# Patient Record
Sex: FEMALE | Race: WHITE
[De-identification: names, ages, dates, MRNs, and addresses within clinical notes are randomized per-mention and may not be internally consistent; named-entity substitution may affect disease eponyms.]

---

## 2017-02-13 ENCOUNTER — HOSPITAL ENCOUNTER (OUTPATIENT)
Dept: HOSPITAL 45 - C.RDSM | Age: 47
Discharge: HOME | End: 2017-02-13
Attending: ORTHOPAEDIC SURGERY
Payer: COMMERCIAL

## 2017-02-13 DIAGNOSIS — Z96.649: Primary | ICD-10-CM

## 2017-04-24 ENCOUNTER — HOSPITAL ENCOUNTER (OUTPATIENT)
Dept: HOSPITAL 45 - C.RDSM | Age: 47
Discharge: HOME | End: 2017-04-24
Attending: ORTHOPAEDIC SURGERY
Payer: COMMERCIAL

## 2017-04-24 DIAGNOSIS — M25.561: Primary | ICD-10-CM

## 2017-04-24 DIAGNOSIS — M25.562: ICD-10-CM

## 2017-05-18 ENCOUNTER — HOSPITAL ENCOUNTER (EMERGENCY)
Dept: HOSPITAL 45 - C.EDB | Age: 47
LOS: 1 days | Discharge: HOME | End: 2017-05-19
Payer: COMMERCIAL

## 2017-05-18 VITALS
BODY MASS INDEX: 39.38 KG/M2 | HEIGHT: 67.01 IN | WEIGHT: 250.89 LBS | HEIGHT: 67.01 IN | BODY MASS INDEX: 39.38 KG/M2 | WEIGHT: 250.89 LBS

## 2017-05-18 DIAGNOSIS — K21.9: ICD-10-CM

## 2017-05-18 DIAGNOSIS — Z82.49: ICD-10-CM

## 2017-05-18 DIAGNOSIS — E78.5: ICD-10-CM

## 2017-05-18 DIAGNOSIS — Z83.3: ICD-10-CM

## 2017-05-18 DIAGNOSIS — F41.9: ICD-10-CM

## 2017-05-18 DIAGNOSIS — Z87.891: ICD-10-CM

## 2017-05-18 DIAGNOSIS — I10: ICD-10-CM

## 2017-05-18 DIAGNOSIS — M16.10: ICD-10-CM

## 2017-05-18 DIAGNOSIS — M79.7: ICD-10-CM

## 2017-05-18 DIAGNOSIS — E11.9: ICD-10-CM

## 2017-05-18 DIAGNOSIS — Z79.4: ICD-10-CM

## 2017-05-18 DIAGNOSIS — Z51.81: ICD-10-CM

## 2017-05-18 DIAGNOSIS — Z79.01: ICD-10-CM

## 2017-05-18 DIAGNOSIS — R51: Primary | ICD-10-CM

## 2017-05-18 LAB
ALBUMIN/GLOB SERPL: 1.1 {RATIO} (ref 0.9–2)
ALP SERPL-CCNC: 93 U/L (ref 45–117)
ALT SERPL-CCNC: 88 U/L (ref 12–78)
ANION GAP SERPL CALC-SCNC: 8 MMOL/L (ref 3–11)
APPEARANCE UR: CLEAR
AST SERPL-CCNC: 51 U/L (ref 15–37)
BILIRUB UR-MCNC: (no result) MG/DL
BUN SERPL-MCNC: 10 MG/DL (ref 7–18)
BUN/CREAT SERPL: 13.9 (ref 10–20)
CALCIUM SERPL-MCNC: 8.7 MG/DL (ref 8.5–10.1)
CHLORIDE SERPL-SCNC: 107 MMOL/L (ref 98–107)
CKMB/CK RATIO: 0.8 (ref 0–3)
CO2 SERPL-SCNC: 28 MMOL/L (ref 21–32)
COLOR UR: YELLOW
CREAT CL PREDICTED SERPL C-G-VRATE: 133.6 ML/MIN
CREAT SERPL-MCNC: 0.7 MG/DL (ref 0.6–1.2)
EOSINOPHIL NFR BLD AUTO: 233 K/UL (ref 130–400)
GLOBULIN SER-MCNC: 3.5 GM/DL (ref 2.5–4)
GLUCOSE SERPL-MCNC: 142 MG/DL (ref 70–99)
HCT VFR BLD CALC: 44.6 % (ref 37–47)
MAGNESIUM SERPL-MCNC: 2.1 MG/DL (ref 1.8–2.4)
MCH RBC QN AUTO: 28.5 PG (ref 25–34)
MCHC RBC AUTO-ENTMCNC: 33.6 G/DL (ref 32–36)
MCV RBC AUTO: 84.6 FL (ref 80–100)
NITRITE UR QL STRIP: (no result)
PH UR STRIP: 6.5 [PH] (ref 4.5–7.5)
PMV BLD AUTO: 10.3 FL (ref 7.4–10.4)
POTASSIUM SERPL-SCNC: 3.9 MMOL/L (ref 3.5–5.1)
RBC # BLD AUTO: 5.27 M/UL (ref 4.2–5.4)
SODIUM SERPL-SCNC: 143 MMOL/L (ref 136–145)
SP GR UR STRIP: 1.01 (ref 1–1.03)
TSH SERPL-ACNC: 2.29 UIU/ML (ref 0.3–4.5)
URINE BILL WITH OR WITHOUT MIC: (no result)
UROBILINOGEN UR-MCNC: (no result) MG/DL
WBC # BLD AUTO: 5.22 K/UL (ref 4.8–10.8)
ZZUR CULT IF INDIC CLEAN CATCH: NO

## 2017-05-19 VITALS — HEART RATE: 87 BPM | DIASTOLIC BLOOD PRESSURE: 69 MMHG | OXYGEN SATURATION: 94 % | SYSTOLIC BLOOD PRESSURE: 115 MMHG

## 2017-05-19 VITALS — OXYGEN SATURATION: 96 %

## 2017-05-19 LAB
BASOPHILS # BLD: 0.03 K/UL (ref 0–0.2)
BASOPHILS NFR BLD: 0.6 %
COMPLETE: YES
IG%: 0 %
IMM GRANULOCYTES NFR BLD AUTO: 50.6 %
LYME DISEASE AB IGG: (no result)
LYME DISEASE AB IGM: (no result)
LYMPHOCYTES # BLD: 2.64 K/UL (ref 1.2–3.4)
MANUAL MICROSCOPIC REQUIRED?: NO
MONOCYTES NFR BLD: 7.1 %
NEUTROPHILS # BLD AUTO: 5.2 %
NEUTROPHILS NFR BLD AUTO: 36.5 %
REVIEW REQ?: NO

## 2017-05-19 NOTE — EMERGENCY ROOM VISIT NOTE
ED Visit Note


First contact with patient:  22:57


Agree with the physician assistants workup.  I've seen the patient patient's 

 at bedside.  They're ready for discharge we've answered all questions.


Problem List


Medical Problems:


(1) Anxiety


Status: Chronic  





(2) CALCULUS OF KIDNEY


Status: Resolved  





(3) Depression


Status: Chronic  





(4) Fibromyalgia


Status: Chronic  





(5) GERD (gastroesophageal reflux disease)


Status: Chronic  





(6) HLD (hyperlipidemia)


Status: Chronic  





(7) HTN (hypertension)


Status: Chronic  





(8) IDDM (insulin dependent diabetes mellitus)


Status: Chronic  





(9) maxillary sinus surgery


Status: Chronic  





(10) Meningitis


Status: Resolved  





(11) Migraine


Status: Chronic  





(12) MYALGIA AND MYOSITIS NOS


Status: Chronic  





(13) Sinus surgery


Status: Resolved  





Surgical Problems:


(1) H/O laparoscopy


Status: Chronic  





(2) History of cholecystectomy


Status: Resolved  





(3) History of tonsillectomy


Status: Chronic  





(4) S/P hip replacement


Status: Chronic  











Current/Historical Medications


Scheduled


Cholecalciferol (Vitamin D3), 1,000 INTER.UNIT PO QPM


Escitalopram Oxalate (Escitalopram Oxalate), 0.8 ML PO BID


Ferrous Sulfate (Iron Supplement), 325 MG PO QPM


Fluticasone Propionate (Fluticasone Propionate), 2 SPRAYS SMOOTH QPM


Insulin Aspart (Novolog Flexpen), 14 UNITS SQ QAM/NOON


Insulin Aspart (Novolog Flexpen), 16 UNITS SC QPM


Insulin Glargine (Lantus Solostar), 50 UNITS SC QPM


Lisinopril (Lisinopril), 10 MG PO DAILY


Metformin Hcl (Glucophage Ext Rel), 1,000 MG PO BID


Omeprazole (Prilosec), 20 MG PO BID


Oxycodone Hcl (Oxycontin), 10 MG PO BID


Vitamin E (Vitamin E), 400 INTER.UNIT PO QPM


Warfarin Sodium (Warfarin Sodium), 8 MG PO DAILY





Scheduled PRN


Acetamin/Butalbital/Caffeine (Fioricet), 1-2 TABS PO Q4H PRN for HEADACHE


Clonazepam (Klonopin), 1 MG PO BID PRN for Anxiety


Diphenoxylate/Atropine (Lomotil), 0.5 TAB PO Q6H PRN for Diarrhea


Hyoscyamine Sulfate (Levsin), 0.125-0.25 MG PO Q6H PRN for Abdominal Cramping


Ondansetron (Ondansetron HCl), 4 MG PO Q8 PRN for Nausea


Oxycodone Hcl (Oxycontin), 30 MG PO AS DIRECTED PRN for Pain


Tizanidine (Zanaflex ), 4 MG PO TID PRN for FIBROMYALGIA





Allergies


Coded Allergies:  


     Metoclopramide (Verified  Allergy, Intermediate, RASH,ITCH, 5/18/17)


     Sulfa Antibiotics (Verified  Allergy, Intermediate, Rash / Itching, 5/18/17

)


     Dexamethasone (Verified  Allergy, Mild, ITCH, 5/18/17)


 PT REPORTS DIFFUSE BODY ITCH/REDNESS. NOT HIVES PER DR. NEVES.


     Adhesives (Unverified  Allergy, Unknown, REDNESS WITH SOME TAPE-INNER 

THIGH AREA SENSITIVE AREA, 5/18/17)


     Levofloxacin (Verified  Allergy, Unknown, PARANOIA, 5/18/17)


     Morphine (Verified  Allergy, Unknown, NAUSEA AND VOMITING, 5/18/17)


 PT THINKS RECEIVED TOO FAST


     Niacin (Verified  Allergy, Unknown, HIVES, 5/18/17)


     Prochlorperazine (Verified  Adverse Reaction, Intermediate, Dystonic, 5/18/ 17)


     Promethazine (Verified  Adverse Reaction, Intermediate, Dystonic, 5/18/17)





Vital Signs











  Date Time  Temp Pulse Resp B/P Pulse Ox O2 Delivery O2 Flow Rate FiO2


 


5/19/17 00:33     96 Nasal Cannula 2.0 


 


5/19/17 00:32  93 16 123/76 98 Nasal Cannula 2.0 


 


5/18/17 23:48  88 20 110/78 98 Room Air  


 


5/18/17 23:48  87      


 


5/18/17 23:10     98 Room Air  


 


5/18/17 22:56  89 20 140/80 98 Room Air  











Laboratory Results


5/18/17 23:05








Red Blood Count 5.27, Mean Corpuscular Volume 84.6, Mean Corpuscular Hemoglobin 

28.5, Mean Corpuscular Hemoglobin Concent 33.6, Mean Platelet Volume 10.3, 

Neutrophils (%) (Auto) 36.5, Lymphocytes (%) (Auto) 50.6, Monocytes (%) (Auto) 

7.1, Eosinophils (%) (Auto) 5.2, Basophils (%) (Auto) 0.6, Neutrophils # (Auto) 

1.91, Lymphocytes # (Auto) 2.64, Monocytes # (Auto) 0.37, Eosinophils # (Auto) 

0.27, Basophils # (Auto) 0.03





5/18/17 23:05

















Test


  5/18/17


22:53 5/18/17


23:05


 


Urine Color YELLOW  


 


Urine Appearance CLEAR (CLEAR)  


 


Urine pH 6.5 (4.5-7.5)  


 


Urine Specific Gravity


  1.006


(1.000-1.030) 


 


 


Urine Protein NEG (NEG)  


 


Urine Glucose (UA) NEG (NEG)  


 


Urine Ketones NEG (NEG)  


 


Urine Occult Blood NEG (NEG)  


 


Urine Nitrite NEG (NEG)  


 


Urine Bilirubin NEG (NEG)  


 


Urine Urobilinogen NEG (NEG)  


 


Urine Leukocyte Esterase NEG (NEG)  


 


Urine Pregnancy Test NEG (NEG)  


 


White Blood Count


  


  5.22 K/uL


(4.8-10.8)


 


Red Blood Count


  


  5.27 M/uL


(4.2-5.4)


 


Hemoglobin


  


  15.0 g/dL


(12.0-16.0)


 


Hematocrit  44.6 % (37-47) 


 


Mean Corpuscular Volume


  


  84.6 fL


()


 


Mean Corpuscular Hemoglobin


  


  28.5 pg


(25-34)


 


Mean Corpuscular Hemoglobin


Concent 


  33.6 g/dl


(32-36)


 


Platelet Count


  


  233 K/uL


(130-400)


 


Mean Platelet Volume


  


  10.3 fL


(7.4-10.4)


 


Neutrophils (%) (Auto)  36.5 % 


 


Lymphocytes (%) (Auto)  50.6 % 


 


Monocytes (%) (Auto)  7.1 % 


 


Eosinophils (%) (Auto)  5.2 % 


 


Basophils (%) (Auto)  0.6 % 


 


Neutrophils # (Auto)


  


  1.91 K/uL


(1.4-6.5)


 


Lymphocytes # (Auto)


  


  2.64 K/uL


(1.2-3.4)


 


Monocytes # (Auto)


  


  0.37 K/uL


(0.11-0.59)


 


Eosinophils # (Auto)


  


  0.27 K/uL


(0-0.5)


 


Basophils # (Auto)


  


  0.03 K/uL


(0-0.2)


 


RDW Standard Deviation


  


  43.8 fL


(36.4-46.3)


 


RDW Coefficient of Variation


  


  14.2 %


(11.5-14.5)


 


Immature Granulocyte % (Auto)  0.0 % 


 


Immature Granulocyte # (Auto)


  


  0.00 K/uL


(0.00-0.02)


 


Anion Gap


  


  8.0 mmol/L


(3-11)


 


Est Creatinine Clear Calc


Drug Dose 


  133.6 ml/min 


 


 


Estimated GFR (


American) 


  122.1 


 


 


Estimated GFR (Non-


American 


  105.4 


 


 


BUN/Creatinine Ratio  13.9 (10-20) 


 


Calcium Level


  


  8.7 mg/dl


(8.5-10.1)


 


Magnesium Level


  


  2.1 mg/dl


(1.8-2.4)


 


Total Bilirubin


  


  0.3 mg/dl


(0.2-1)


 


Aspartate Amino Transf


(AST/SGOT) 


  51 U/L (15-37) 


 


 


Alanine Aminotransferase


(ALT/SGPT) 


  88 U/L (12-78) 


 


 


Alkaline Phosphatase


  


  93 U/L


()


 


Total Creatine Kinase


  


  157 U/L


()


 


Creatine Kinase MB


  


  1.2 ng/ml


(0.5-3.6)


 


Creatine Kinase MB Ratio  0.8 (0-3.0) 


 


Troponin I


  


  < 0.015 ng/ml


(0-0.045)


 


Total Protein


  


  7.3 gm/dl


(6.4-8.2)


 


Albumin


  


  3.8 gm/dl


(3.4-5.0)


 


Globulin


  


  3.5 gm/dl


(2.5-4.0)


 


Albumin/Globulin Ratio  1.1 (0.9-2) 


 


Thyroid Stimulating Hormone


(TSH) 


  2.290 uIu/ml


(0.300-4.500)


 


Lyme Disease IgG Antibody  NEG (NEG) 


 


Lyme Disease IgM Antibody  NEG (NEG) 











Medications Administered











 Medications


  (Trade)  Dose


 Ordered  Sig/Carmelita


 Route  Start Time


 Stop Time Status Last Admin


Dose Admin


 


 Lorazepam


  (Ativan Inj)  1 mg  NOW  STAT


 IV  5/18/17 23:23


 5/18/17 23:24 DC 5/18/17 23:28


1 MG


 


 Albuterol/


 Ipratropium


  (Duoneb)  3 ml  NOW  STAT


 INH  5/18/17 23:48


 5/18/17 23:50 DC 5/19/17 00:04


3 ML


 


 Diphenhydramine


 HCl


  (Benadryl Inj)  25 mg  NOW  STAT


 IV  5/18/17 23:58


 5/18/17 23:59 DC 5/19/17 00:06


25 MG


 


 Hydromorphone HCl


  (Dilaudid Inj)  1 mg  NOW  STAT


 IV  5/18/17 23:58


 5/18/17 23:59 DC 5/19/17 00:06


1 MG


 


 Ondansetron HCl


  (Zofran Inj)  4 mg  NOW  STAT


 IV  5/18/17 23:58


 5/18/17 23:59 DC 5/19/17 00:04


4 MG











Departure Information


Dispostion


Home / Self-Care





Condition


GOOD





Referrals


Cristy Braden M.D. (PCP)





Forms


HOME CARE DOCUMENTATION FORM,                                                 

               IMPORTANT VISIT INFORMATION





Patient Instructions


My [facility]





Additional Instructions





You have been treated in the Emergency Department for a Headache.  You have 

received pain medicine in the emergency department which impairs your ability 

to operate a vehicle. It is illegal for you to drive after receiving these 

medicines. 





For pain control, you can use the following over-the-counter medicines (if >11 yo):





- Regular strength (325mg/tab) Tylenol (acetaminophen) 2 tabs every 4-6 hours 

as needed. Do not exceed 12 tablets in a 24 hour period. Avoid taking more than 

4 grams (4000 mg) of Tylenol per day. This includes any other sources of 

acetaminophen you may take on a regular basis.





- Regular strength (200 mg/tab) Advil (ibuprofen) 1-2 tabs every 4-6 hours as 

needed. Do not exceed a dose of 3200 mg per day.





You should relax in a quiet, dark place for the rest of the day. Avoid any 

possible triggers including: cigarette smoke, caffeine, nicotine, chocolate, 

wine, beer, loud noises or music, or bright lights. 





You should schedule a follow-up appointment in 2-3 days with your Primary Care 

Provider or established Neurologist for further evaluation and treatment of 

your Headache.





Return to the Emergency Department if your current symptoms worsen despite 

treatment course outlined above, or if you develop any of the following symptoms

: intractable pain despite aforementioned treatment course, visual disturbances

, loss of vision, unilateral weakness or facial drooping, slurring of speech, 

loss of coordination, or loss of consciousness.

## 2017-05-19 NOTE — DIAGNOSTIC IMAGING REPORT
CHEST ONE VIEW PORTABLE



CLINICAL HISTORY: Left-sided chest pain    



COMPARISON STUDY:  12/20/2016



FINDINGS: The cardiac and mediastinal contours are normal. There is no evidence

of focal pulmonary consolidation. There is no evidence of failure. No pleural

effusions are visualized.[ 



IMPRESSION: No active disease in the chest.







Electronically signed by:  Daniele Gerardo M.D.

5/19/2017 6:39 AM



Dictated Date/Time:  5/19/2017 6:39 AM

## 2017-05-19 NOTE — EMERGENCY ROOM VISIT NOTE
History


First contact with patient:  22:57


Chief Complaint:  CHEST PAIN


Stated Complaint:  CHEST PAINS,DIZZY,AB PAIN,EXTREME FATIGUE


Nursing Triage Summary:  


c/o chest pain to center of chest along with left shoulder discomfort since 

this 


morning. patient also states she is having some abdominal pain and what she 


describes as reflux type symptoms.





History of Present Illness


The patient is a 46 year old female who presents to the Emergency Room with 

multiple complaints.  The patient states that she has chest heaviness, left 

shoulder pain, abdominal pain, headache, body aches, left calf pain and nausea.

  She states that all these symptoms occurred suddenly after receiving a 

Euveflexx injection into her left knee at the orthopedic office earlier today.  

She does have a history of chronic headaches due to a previous sinus procedure 

but states that this is worse than her typical headaches.  It is not the worst 

headache of her life.  She does report a history of anxiety and feels that her 

symptoms may be related to her anxiety.  She feels like she is not able to 

fully inflate her left lung.  She denies any cardiac history.  She took 0.5 mg 

Klonopin earlier for her symptoms but this did not help.  She does state that 

she called the orthopedic provider on-call and they do not feel this is a 

reaction to the medication she received.





Review of Systems


A complete 10 point review of systems was reviewed with the patient with 

pertinent positives and negatives as per history of present illness. All else 

were negative.





Past Medical/Surgical History


Medical Problems:


(1) Anxiety


(2) CALCULUS OF KIDNEY


(3) Depression


(4) DJD (degenerative joint disease) of hip


(5) Fibromyalgia


(6) GERD (gastroesophageal reflux disease)


(7) HLD (hyperlipidemia)


(8) HTN (hypertension)


(9) IDDM (insulin dependent diabetes mellitus)


(10) maxillary sinus surgery


(11) Meningitis


(12) Migraine


(13) MYALGIA AND MYOSITIS NOS


(14) Sinus surgery


Surgical Problems:


(1) H/O laparoscopy


(2) History of cholecystectomy


(3) History of tonsillectomy


(4) S/P hip replacement








Family History





Cancer


Diabetes mellitus


Fhx: high triglycerides


Heart disease


Hypertension


Kidney disease


Kidney stones





Social History


Smoking Status:  Former Smoker


Alcohol Use:  none


Drug Use:  none


Marital Status:  


Housing Status:  lives with family


Occupation Status:  unemployed





Current/Historical Medications


Scheduled


Cholecalciferol (Vitamin D3), 1,000 INTER.UNIT PO QPM


Escitalopram Oxalate (Escitalopram Oxalate), 0.8 ML PO BID


Ferrous Sulfate (Iron Supplement), 325 MG PO QPM


Fluticasone Propionate (Fluticasone Propionate), 2 SPRAYS SMOOTH QPM


Insulin Aspart (Novolog Flexpen), 14 UNITS SQ QAM/NOON


Insulin Aspart (Novolog Flexpen), 16 UNITS SC QPM


Insulin Glargine (Lantus Solostar), 50 UNITS SC QPM


Lisinopril (Lisinopril), 10 MG PO DAILY


Metformin Hcl (Glucophage Ext Rel), 1,000 MG PO BID


Omeprazole (Prilosec), 20 MG PO BID


Oxycodone Hcl (Oxycontin), 10 MG PO BID


Vitamin E (Vitamin E), 400 INTER.UNIT PO QPM


Warfarin Sodium (Warfarin Sodium), 8 MG PO DAILY





Scheduled PRN


Acetamin/Butalbital/Caffeine (Fioricet), 1-2 TABS PO Q4H PRN for HEADACHE


Clonazepam (Klonopin), 1 MG PO BID PRN for Anxiety


Diphenoxylate/Atropine (Lomotil), 0.5 TAB PO Q6H PRN for Diarrhea


Hyoscyamine Sulfate (Levsin), 0.125-0.25 MG PO Q6H PRN for Abdominal Cramping


Ondansetron (Ondansetron HCl), 4 MG PO Q8 PRN for Nausea


Oxycodone Hcl (Oxycontin), 30 MG PO AS DIRECTED PRN for Pain


Tizanidine (Zanaflex ), 4 MG PO TID PRN for FIBROMYALGIA





Allergies


Coded Allergies:  


     Metoclopramide (Verified  Allergy, Intermediate, RASH,ITCH, 5/18/17)


     Sulfa Antibiotics (Verified  Allergy, Intermediate, Rash / Itching, 5/18/17

)


     Dexamethasone (Verified  Allergy, Mild, ITCH, 5/18/17)


 PT REPORTS DIFFUSE BODY ITCH/REDNESS. NOT HIVES PER DR. NEVES.


     Adhesives (Unverified  Allergy, Unknown, REDNESS WITH SOME TAPE-INNER 

THIGH AREA SENSITIVE AREA, 5/18/17)


     Levofloxacin (Verified  Allergy, Unknown, PARANOIA, 5/18/17)


     Morphine (Verified  Allergy, Unknown, NAUSEA AND VOMITING, 5/18/17)


 PT THINKS RECEIVED TOO FAST


     Niacin (Verified  Allergy, Unknown, HIVES, 5/18/17)


     Prochlorperazine (Verified  Adverse Reaction, Intermediate, Dystonic, 5/18/ 17)


     Promethazine (Verified  Adverse Reaction, Intermediate, Dystonic, 5/18/17)





Physical Exam


Vital Signs











  Date Time  Temp Pulse Resp B/P Pulse Ox O2 Delivery O2 Flow Rate FiO2


 


5/19/17 01:24  87 20 115/69 94   


 


5/19/17 00:33     96 Nasal Cannula 2.0 


 


5/19/17 00:32  93 16 123/76 98 Nasal Cannula 2.0 


 


5/18/17 23:48  88 20 110/78 98 Room Air  


 


5/18/17 23:48  87      


 


5/18/17 23:10     98 Room Air  


 


5/18/17 22:56  89 20 140/80 98 Room Air  











Physical Exam


VITALS: Vitals are noted on the nurse's note and reviewed by myself.  Vital 

signs stable.


GENERAL: This is a 46-year-old female, in no acute distress, nondiaphoretic, 

well-developed well-nourished.


SKIN: The skin was without rashes, erythema, edema, or bruising.  


HEAD: Normocephalic atraumatic.  


EARS: External auditory canals clear, tympanic membranes pearly gray without 

erythema or effusion bilaterally.


EYES: Pupils equal round and reactive to light and accommodation.  Conjunctivae 

without injection, sclerae without icterus.  Extraocular movements intact.  No 

nystagmus.


MOUTH: Mucous membranes moist.  Tonsils are not enlarged.  Pharynx without 

erythema or exudate.  


NECK: Supple without nuchal rigidity.  No lymphadenopathy.  No meningismus.


HEART: Regular rate and rhythm without murmurs gallops or rubs.


LUNGS: Clear to auscultation bilaterally without wheezes, rales or rhonchi.   

No retractions or accessory muscle use.


ABDOMEN: Positive bowel sounds x 4.  Soft, no tenderness to palpation.


MUSCULOSKELETAL: Full range of motion in all extremities.   Strength 5/5 

throughout.


NEURO: Patient was alert and oriented to person place and time.  Normal 

sensation to light and sharp touch.  No focal neurological deficits.





Medical Decision & Procedures


ER Provider


Diagnostic Interpretation:


CT HEAD:





No ICH, mass effect, or edema.  No evidence of acute cortical stroke.


Visualized sinuses and mastoid air cells are clear.





Radiologist:  Taiwo Roy MD





Chest x-ray interpretation:


No acute cardiopulmonary abnormalities.  No pneumothorax.  No osseous 

abnormalities.





Laboratory Results


5/18/17 23:05








Red Blood Count 5.27, Mean Corpuscular Volume 84.6, Mean Corpuscular Hemoglobin 

28.5, Mean Corpuscular Hemoglobin Concent 33.6, Mean Platelet Volume 10.3, 

Neutrophils (%) (Auto) 36.5, Lymphocytes (%) (Auto) 50.6, Monocytes (%) (Auto) 

7.1, Eosinophils (%) (Auto) 5.2, Basophils (%) (Auto) 0.6, Neutrophils # (Auto) 

1.91, Lymphocytes # (Auto) 2.64, Monocytes # (Auto) 0.37, Eosinophils # (Auto) 

0.27, Basophils # (Auto) 0.03





5/18/17 23:05

















Test


  5/18/17


22:53 5/18/17


23:05


 


Urine Color YELLOW  


 


Urine Appearance CLEAR (CLEAR)  


 


Urine pH 6.5 (4.5-7.5)  


 


Urine Specific Gravity


  1.006


(1.000-1.030) 


 


 


Urine Protein NEG (NEG)  


 


Urine Glucose (UA) NEG (NEG)  


 


Urine Ketones NEG (NEG)  


 


Urine Occult Blood NEG (NEG)  


 


Urine Nitrite NEG (NEG)  


 


Urine Bilirubin NEG (NEG)  


 


Urine Urobilinogen NEG (NEG)  


 


Urine Leukocyte Esterase NEG (NEG)  


 


Urine Pregnancy Test NEG (NEG)  


 


White Blood Count


  


  5.22 K/uL


(4.8-10.8)


 


Red Blood Count


  


  5.27 M/uL


(4.2-5.4)


 


Hemoglobin


  


  15.0 g/dL


(12.0-16.0)


 


Hematocrit  44.6 % (37-47) 


 


Mean Corpuscular Volume


  


  84.6 fL


()


 


Mean Corpuscular Hemoglobin


  


  28.5 pg


(25-34)


 


Mean Corpuscular Hemoglobin


Concent 


  33.6 g/dl


(32-36)


 


Platelet Count


  


  233 K/uL


(130-400)


 


Mean Platelet Volume


  


  10.3 fL


(7.4-10.4)


 


Neutrophils (%) (Auto)  36.5 % 


 


Lymphocytes (%) (Auto)  50.6 % 


 


Monocytes (%) (Auto)  7.1 % 


 


Eosinophils (%) (Auto)  5.2 % 


 


Basophils (%) (Auto)  0.6 % 


 


Neutrophils # (Auto)


  


  1.91 K/uL


(1.4-6.5)


 


Lymphocytes # (Auto)


  


  2.64 K/uL


(1.2-3.4)


 


Monocytes # (Auto)


  


  0.37 K/uL


(0.11-0.59)


 


Eosinophils # (Auto)


  


  0.27 K/uL


(0-0.5)


 


Basophils # (Auto)


  


  0.03 K/uL


(0-0.2)


 


RDW Standard Deviation


  


  43.8 fL


(36.4-46.3)


 


RDW Coefficient of Variation


  


  14.2 %


(11.5-14.5)


 


Immature Granulocyte % (Auto)  0.0 % 


 


Immature Granulocyte # (Auto)


  


  0.00 K/uL


(0.00-0.02)


 


Anion Gap


  


  8.0 mmol/L


(3-11)


 


Est Creatinine Clear Calc


Drug Dose 


  133.6 ml/min 


 


 


Estimated GFR (


American) 


  122.1 


 


 


Estimated GFR (Non-


American 


  105.4 


 


 


BUN/Creatinine Ratio  13.9 (10-20) 


 


Calcium Level


  


  8.7 mg/dl


(8.5-10.1)


 


Magnesium Level


  


  2.1 mg/dl


(1.8-2.4)


 


Total Bilirubin


  


  0.3 mg/dl


(0.2-1)


 


Aspartate Amino Transf


(AST/SGOT) 


  51 U/L (15-37) 


 


 


Alanine Aminotransferase


(ALT/SGPT) 


  88 U/L (12-78) 


 


 


Alkaline Phosphatase


  


  93 U/L


()


 


Total Creatine Kinase


  


  157 U/L


()


 


Creatine Kinase MB


  


  1.2 ng/ml


(0.5-3.6)


 


Creatine Kinase MB Ratio  0.8 (0-3.0) 


 


Troponin I


  


  < 0.015 ng/ml


(0-0.045)


 


Total Protein


  


  7.3 gm/dl


(6.4-8.2)


 


Albumin


  


  3.8 gm/dl


(3.4-5.0)


 


Globulin


  


  3.5 gm/dl


(2.5-4.0)


 


Albumin/Globulin Ratio  1.1 (0.9-2) 


 


Thyroid Stimulating Hormone


(TSH) 


  2.290 uIu/ml


(0.300-4.500)


 


Lyme Disease IgG Antibody  NEG (NEG) 


 


Lyme Disease IgM Antibody  NEG (NEG) 











Medications Administered











 Medications


  (Trade)  Dose


 Ordered  Sig/Carmelita


 Route  Start Time


 Stop Time Status Last Admin


Dose Admin


 


 Lorazepam


  (Ativan Inj)  1 mg  NOW  STAT


 IV  5/18/17 23:23


 5/18/17 23:24 DC 5/18/17 23:28


1 MG


 


 Albuterol/


 Ipratropium


  (Duoneb)  3 ml  NOW  STAT


 INH  5/18/17 23:48


 5/18/17 23:50 DC 5/19/17 00:04


3 ML


 


 Diphenhydramine


 HCl


  (Benadryl Inj)  25 mg  NOW  STAT


 IV  5/18/17 23:58


 5/18/17 23:59 DC 5/19/17 00:06


25 MG


 


 Hydromorphone HCl


  (Dilaudid Inj)  1 mg  NOW  STAT


 IV  5/18/17 23:58


 5/18/17 23:59 DC 5/19/17 00:06


1 MG


 


 Ondansetron HCl


  (Zofran Inj)  4 mg  NOW  STAT


 IV  5/18/17 23:58


 5/18/17 23:59 DC 5/19/17 00:04


4 MG











ECG


Rate (beats per minute):  95


Rhythm:  normal sinus


Findings:  no acute ischemic change, no ectopy


Change:  no significant change





ED Course


The patient was evaluated as above.  Labs were drawn and IV access was 

obtained.  





Patient was medicated with 1 mg Ativan IV.





Imaging studies were performed and read by kia as above. 





Patient was reevaluated and felt slightly better.  She still felt like she was 

having difficulty taking a deep breath despite oxygen saturations remaining 100

% on room air.  She was ordered a DuoNeb treatment.  She also complained of 

headache and was given 1 mg Dilaudid IV, 4 mg Zofran IV and 25 mg Benadryl IV.





Patient was reevaluated and her symptoms had completely resolved.  She did 

admit that she felt her symptoms were likely anxiety related.





Discharge instructions were reviewed with the patient.  The patient verbalized 

understanding of my assessment and treatment plan and was discharged home in 

good condition.





Medical Decision


Differential diagnosis includes subarachnoid hemorrhage, acute coronary syndrome

, infection, electrolyte abnormality, medication reaction, among others.





The patient is a 46-year-old female who presents today complaining of multiple 

complaints.  Labs revealed no leukocytosis, anemia or concerning electrolyte 

abnormalities.  LFTs are mildly elevated.  Glucose is mildly elevated.  

Urinalysis was not suggestive of infection.  Urine pregnancy was negative.  EKG 

was interpreted by myself and shows no acute ischemic changes.  CT of the head 

was unremarkable.  The patient felt much better after the above treatment.  She 

does admit to anxiety and a feeling that her symptoms tonight are likely caused 

by anxiety regarding the injection she received.  The patient was symptom free 

at time of discharge.  She will follow-up with her primary care provider 

tomorrow.





The patient was independently evaluated by Dr. Zuleta, ED attending physician, 

who agreed with my assessment and treatment plan.





Based on the patient's presentation and work up, I feel the patient is stable 

for outpatient treatment.  The patient was educated to return to the emergency 

department for any worsening of their current condition or new/concerning 

symptoms.  She will follow up with her PCP.





Impression





 Primary Impression:  


 Headache





Departure Information


Dispostion


Home / Self-Care





Condition


GOOD





Referrals


Cristy Braden M.D. (PCP)





Patient Instructions


My [facility]





Additional Instructions





You have been treated in the Emergency Department for a Headache.  You have 

received pain medicine in the emergency department which impairs your ability 

to operate a vehicle. It is illegal for you to drive after receiving these 

medicines. 





For pain control, you can use the following over-the-counter medicines (if >11 yo):





- Regular strength (325mg/tab) Tylenol (acetaminophen) 2 tabs every 4-6 hours 

as needed. Do not exceed 12 tablets in a 24 hour period. Avoid taking more than 

4 grams (4000 mg) of Tylenol per day. This includes any other sources of 

acetaminophen you may take on a regular basis.





- Regular strength (200 mg/tab) Advil (ibuprofen) 1-2 tabs every 4-6 hours as 

needed. Do not exceed a dose of 3200 mg per day.





You should relax in a quiet, dark place for the rest of the day. Avoid any 

possible triggers including: cigarette smoke, caffeine, nicotine, chocolate, 

wine, beer, loud noises or music, or bright lights. 





You should schedule a follow-up appointment in 2-3 days with your Primary Care 

Provider or established Neurologist for further evaluation and treatment of 

your Headache.





Return to the Emergency Department if your current symptoms worsen despite 

treatment course outlined above, or if you develop any of the following symptoms

: intractable pain despite aforementioned treatment course, visual disturbances

, loss of vision, unilateral weakness or facial drooping, slurring of speech, 

loss of coordination, or loss of consciousness.

## 2017-05-19 NOTE — DIAGNOSTIC IMAGING REPORT
CT HEAD WITHOUT CONTRAST (CT)



CLINICAL HISTORY: Sudden onset severe headache    



COMPARISON STUDY:  8/17/2016



TECHNIQUE:  Axial CT of the brain is performed from the vertex to the skull

base. IV contrast was not administered for this examination.



CT DOSE: 614.27 mGy.cm



FINDINGS:



No intra or extra-axial mass lesions are visualized. There is no CT evidence of

acute cortical infarction. There is no evidence of midline shift. There is no

acute  hemorrhage. No calvarial fractures are visualized. 

   

There is no evidence of pathologic ventricular dilatation.

There is no evidence of acute sinusitis



IMPRESSION: Normal noncontrast head CT.







Electronically signed by:  Daniele Gerardo M.D.

5/19/2017 6:36 AM



Dictated Date/Time:  5/19/2017 6:35 AM

## 2017-07-26 ENCOUNTER — HOSPITAL ENCOUNTER (EMERGENCY)
Dept: HOSPITAL 45 - C.EDB | Age: 47
Discharge: HOME | End: 2017-07-26
Payer: COMMERCIAL

## 2017-07-26 VITALS
HEIGHT: 65.98 IN | WEIGHT: 252.43 LBS | HEIGHT: 65.98 IN | BODY MASS INDEX: 40.57 KG/M2 | BODY MASS INDEX: 40.57 KG/M2 | WEIGHT: 252.43 LBS

## 2017-07-26 VITALS
TEMPERATURE: 97.7 F | SYSTOLIC BLOOD PRESSURE: 123 MMHG | OXYGEN SATURATION: 97 % | DIASTOLIC BLOOD PRESSURE: 86 MMHG | HEART RATE: 81 BPM

## 2017-07-26 DIAGNOSIS — Z87.442: ICD-10-CM

## 2017-07-26 DIAGNOSIS — Z96.649: ICD-10-CM

## 2017-07-26 DIAGNOSIS — Z79.84: ICD-10-CM

## 2017-07-26 DIAGNOSIS — Z83.3: ICD-10-CM

## 2017-07-26 DIAGNOSIS — Z84.1: ICD-10-CM

## 2017-07-26 DIAGNOSIS — M79.7: ICD-10-CM

## 2017-07-26 DIAGNOSIS — K21.9: ICD-10-CM

## 2017-07-26 DIAGNOSIS — M16.10: ICD-10-CM

## 2017-07-26 DIAGNOSIS — Z79.4: ICD-10-CM

## 2017-07-26 DIAGNOSIS — R51: Primary | ICD-10-CM

## 2017-07-26 DIAGNOSIS — I10: ICD-10-CM

## 2017-07-26 DIAGNOSIS — Z82.49: ICD-10-CM

## 2017-07-26 DIAGNOSIS — F32.9: ICD-10-CM

## 2017-07-26 DIAGNOSIS — F41.9: ICD-10-CM

## 2017-07-26 DIAGNOSIS — E78.5: ICD-10-CM

## 2017-07-26 DIAGNOSIS — E11.9: ICD-10-CM

## 2017-07-26 NOTE — EMERGENCY ROOM VISIT NOTE
History


First contact with patient:  21:49


Chief Complaint:  HEADACHE


Stated Complaint:  SEVERE HEADACHE, NAUSEA, LIGHT&SOUND SENSITIVITY





History of Present Illness


The patient is a 46 year old female who presents to the Emergency Room with 

complaints of a severe headache which began early this morning.  The patient 

states that she has had a gradually worsening headache throughout the day.  She 

reports a history of chronic headaches, but states this feels different.  She 

reports her headaches are typically located behind her right eye, but this one 

radiates from both temples across her forehead.  She states she has had 

headaches like this off-and-on for the past few weeks.  She took Lorcet without 

relief.  She rates her overall discomfort an 8/10.  She reports sensitivity to 

light and noise.  She reports nausea, but denies vomiting.  She states that her 

head feels "heavy."  She denies any neck pain/stiffness.  She denies any recent 

illness, fevers/chills, numbness/weakness, slurred speech, blurred vision, 

chest pain or shortness of breath.





Review of Systems


A complete 10 point review of systems was reviewed with the patient with 

pertinent positives and negatives as per history of present illness. All else 

were negative.





Past Medical/Surgical History


Medical Problems:


(1) Anxiety


(2) CALCULUS OF KIDNEY


(3) Depression


(4) DJD (degenerative joint disease) of hip


(5) Fibromyalgia


(6) GERD (gastroesophageal reflux disease)


(7) HLD (hyperlipidemia)


(8) HTN (hypertension)


(9) IDDM (insulin dependent diabetes mellitus)


(10) maxillary sinus surgery


(11) Meningitis


(12) Migraine


(13) MYALGIA AND MYOSITIS NOS


(14) Sinus surgery


Surgical Problems:


(1) H/O laparoscopy


(2) History of cholecystectomy


(3) History of tonsillectomy


(4) S/P hip replacement








Family History





Cancer


Diabetes mellitus


Fhx: high triglycerides


Heart disease


Hypertension


Kidney disease


Kidney stones





Social History


Smoking Status:  Never Smoker


Alcohol Use:  none


Drug Use:  none


Marital Status:  


Housing Status:  lives with family


Occupation Status:  unemployed





Current/Historical Medications


Scheduled


Cholecalciferol (Vitamin D3), 1,000 INTER.UNIT PO QPM


Escitalopram Oxalate (Escitalopram Oxalate), 1 ML PO BID


Ferrous Sulfate (Ferrous Sulfate), 325 MG PO QPM


Fish Oil (Toyah-3), 2 CAP PO DAILY


Fluticasone Propionate (Fluticasone Propionate), 2 SPRAYS SMOOTH QPM


Insulin Aspart (Novolog Flexpen), 16 UNITS SC AC


Insulin Glargine (Lantus Solostar), 58 UNITS SC QPM


Lisdexamfetamine Dimesylate (Vyvanse), 50 MG PO DAILY


Lisinopril (Lisinopril), 10 MG PO DAILY


Metformin Hcl (Glucophage Ext Rel), 1,000 MG PO BID


Milk Thistle (Silybum Marianum (Milk Thistle), 2 CAP PO DAILY


Omeprazole (Prilosec), 20 MG PO BID


Oxycodone Hcl (Oxycontin), 40 MG PO Q12


Vitamin E (Vitamin E), 400 INTER.UNIT PO QPM





Scheduled PRN


Acetamin/Butalbital/Caffeine (Fioricet), 1-2 TABS PO Q4H PRN for Headache


Clonazepam (Klonopin), 1 MG PO BID PRN for Anxiety


Diphenoxylate/Atropine (Lomotil), 0.5 TAB PO Q6H PRN for Diarrhea


Hyoscyamine Sulfate (Levsin), 0.125-0.25 MG PO Q6H PRN for Abdominal Cramping


Ondansetron (Ondansetron HCl), 4 MG PO Q8 PRN for Nausea


Oxycodone Hcl (Oxycodone Hcl), 10 MG PO Q6H PRN for Breakthrough Pain


Tizanidine (Zanaflex ), 4 MG PO TID PRN for Fibromyalgia





Physical Exam


Vital Signs











  Date Time  Temp Pulse Resp B/P (MAP) Pulse Ox O2 Delivery O2 Flow Rate FiO2


 


7/26/17 21:46 36.5 81 16 123/86 97 Room Air  











Physical Exam


VITALS: Vitals are noted on the nurse's note and reviewed by myself.  Vital 

signs stable.


GENERAL: This is a 46-year-old female, in no acute distress, nondiaphoretic, 

well-developed well-nourished.


HEAD: Normocephalic atraumatic.  


EARS: External auditory canals clear, tympanic membranes pearly gray without 

erythema or effusion bilaterally.


EYES: Pupils equal round and reactive to light and accommodation.  Conjunctivae 

without injection, sclerae without icterus.  Extraocular movements intact.  


NOSE: Patent, turbinates without inflammation or discharge.  No sinus 

tenderness.


MOUTH: Mucous membranes moist.  Tonsils are not enlarged.  Pharynx without 

erythema or exudate.  


NECK: Supple without nuchal rigidity.  No lymphadenopathy.  No meningismus.


HEART: Regular rate and rhythm without murmurs gallops or rubs.


LUNGS: Clear to auscultation bilaterally without wheezes, rales or rhonchi. 


MUSCULOSKELETAL: Full range of motion in all extremity.  Strength 5/5 

throughout.


NEURO: Patient was alert and oriented to person place and time.  Normal 

sensation to light and sharp touch.   No focal neurological deficits.  Normal 

finger to nose testing.





Medical Decision & Procedures


ER Provider


Diagnostic Interpretation:


CT OF THE HEAD WITHOUT CONTRAST





FINDINGS: No acute intracranial hemorrhage, midline shift or mass effect is


present. Brain volume is normal. Ventricular system is normal. Basilar cisterns


are patent. There are no extra-axial collections. Gray-white differentiation is


maintained. There are no findings to suggest acute dural sinus thrombosis or


acute territorial infarct. There are postoperative findings within the sinuses.


There is mild polypoid mucosal thickening within the sinuses and nasal cavity.


Mastoid air cells are clear. There are no calvarial abnormalities.





IMPRESSION:  No acute intracranial findings.





Medications Administered











 Medications


  (Trade)  Dose


 Ordered  Sig/Carmelita


 Route  Start Time


 Stop Time Status Last Admin


Dose Admin


 


 Sodium Chloride  1,000 ml @ 


 999 mls/hr  Q1H1M STAT


 IV  7/26/17 21:58


 7/26/17 22:58 DC 7/26/17 22:21


999 MLS/HR


 


 Ketorolac


 Tromethamine


  (Toradol Inj)  30 mg  NOW  STAT


 IV  7/26/17 21:58


 7/26/17 22:01 DC 7/26/17 22:21


30 MG


 


 Diphenhydramine


 HCl


  (Benadryl Inj)  50 mg  NOW  STAT


 IV  7/26/17 21:58


 7/26/17 22:01 DC 7/26/17 22:21


50 MG


 


 Ondansetron HCl


  (Zofran Inj)  4 mg  NOW  STAT


 IV  7/26/17 21:58


 7/26/17 22:01 DC 7/26/17 22:21


4 MG


 


 Hydromorphone HCl


  (Dilaudid Inj)  1 mg  NOW  STAT


 IV  7/26/17 22:52


 7/26/17 22:54 DC 7/26/17 23:01


1 MG


 


 Ondansetron HCl


  (Zofran Inj)  4 mg  NOW  STAT


 IV  7/26/17 22:52


 7/26/17 22:54 DC 7/26/17 23:01


4 MG











ED Course


The patient was evaluated as above.  Labs were drawn and IV access was 

obtained.  





Patient was medicated with 30 mg Toradol, 4 mg Zofran and 50 mg Benadryl.





CT of the head was performed and read by radiology as above. 





Patient was reevaluated and and stated that she felt worse.  She was given 1 mg 

Dilaudid IV.





Discharge instructions were reviewed with the patient.  The patient verbalized 

understanding of my assessment and treatment plan and was discharged home in 

good condition.





Medical Decision


The differential diagnosis includes acute intracranial bleed, meningitis, 

encephalitis, mass or mass effect, sinusitis, infection, tumor, headache, 

temporal arteritis and carbon monoxide exposure, and migraine.





The patient is a 46-year-old female who presents today complaining of a 

headache.  Patient does have a history of migraines but states this feels 

different.  For this reason, CT of the head was performed.  This was 

unremarkable.  Patient received IV Toradol, Benadryl and Zofran without relief.

  She was given 1 mg Dilaudid with relief.  No evidence of meningitis or 

encephalitis on exam.  Patient was referred back to her primary care provider 

for further evaluation.  She should return here for any worsening or new/

concerning symptoms.





Medication Reconcilliation


Current Medication List:  was personally reviewed by me





Blood Pressure Screening


Patient's blood pressure:  Normal blood pressure





Impression





 Primary Impression:  


 Headache





Departure Information


Dispostion


Home / Self-Care





Condition


GOOD





Referrals


Cristy Braden M.D. (PCP)





Patient Instructions


My [facility]





Additional Instructions





You have been treated in the Emergency Department for a Headache.  You have 

received pain medicine in the emergency department which impairs your ability 

to operate a vehicle. It is illegal for you to drive after receiving these 

medicines. 





For pain control, you can use the following over-the-counter medicines (if >11 yo):





- Regular strength (325mg/tab) Tylenol (acetaminophen) 2 tabs every 4-6 hours 

as needed. Do not exceed 12 tablets in a 24 hour period. Avoid taking more than 

4 grams (4000 mg) of Tylenol per day. This includes any other sources of 

acetaminophen you may take on a regular basis.





- Regular strength (200 mg/tab) Advil (ibuprofen) 1-2 tabs every 4-6 hours as 

needed. Do not exceed a dose of 3200 mg per day.





You should relax in a quiet, dark place for the rest of the day. Avoid any 

possible triggers including: cigarette smoke, caffeine, nicotine, chocolate, 

wine, beer, loud noises or music, or bright lights. 





You should schedule a follow-up appointment in 2-3 days with your Primary Care 

Provider for further evaluation and treatment of your Headache.





Return to the Emergency Department if your current symptoms worsen despite 

treatment course outlined above, or if you develop any of the following symptoms

: intractable pain despite aforementioned treatment course, visual disturbances

, loss of vision, unilateral weakness or facial drooping, slurring of speech, 

loss of coordination, or loss of consciousness.





Problem Qualifiers








 Primary Impression:  


 Headache


 Headache type:  unspecified  Headache chronicity pattern:  acute headache  

Intractability:  not intractable  Qualified Codes:  R51 - Headache

## 2017-07-26 NOTE — DIAGNOSTIC IMAGING REPORT
CT OF THE HEAD WITHOUT CONTRAST



CLINICAL HISTORY: Headache.    



COMPARISON STUDY:  Head CT May 18, 2017. 



CT DOSE: 537.48 mGy.cm



TECHNIQUE: Helical axial images of the head were obtained without IV contrast.

Automated exposure control was utilized for the study.  A dose lowering

technique was utilized adhering to the principles of ALARA.





FINDINGS: No acute intracranial hemorrhage, midline shift or mass effect is

present. Brain volume is normal. Ventricular system is normal. Basilar cisterns

are patent. There are no extra-axial collections. Gray-white differentiation is

maintained. There are no findings to suggest acute dural sinus thrombosis or

acute territorial infarct. There are postoperative findings within the sinuses.

There is mild polypoid mucosal thickening within the sinuses and nasal cavity.

Mastoid air cells are clear. There are no calvarial abnormalities.



IMPRESSION:  No acute intracranial findings. 







Electronically signed by:  Abel Graham M.D.

7/26/2017 10:37 PM



Dictated Date/Time:  7/26/2017 10:34 PM

## 2017-08-11 ENCOUNTER — HOSPITAL ENCOUNTER (OUTPATIENT)
Dept: HOSPITAL 45 - C.RDSM | Age: 47
Discharge: HOME | End: 2017-08-11
Attending: PHYSICIAN ASSISTANT
Payer: COMMERCIAL

## 2017-08-11 DIAGNOSIS — R10.30: Primary | ICD-10-CM

## 2017-08-11 DIAGNOSIS — Z96.641: ICD-10-CM

## 2017-08-11 NOTE — DIAGNOSTIC IMAGING REPORT
RIGHT HIP UNILATERAL MIN 2 VIEWS



CLINICAL HISTORY: RIGHT GROIN PAIN

Right pain



COMPARISON: None.



DISCUSSION: Total right hip replacement. Good contact between prosthetic and

underlying bone. There is no evidence for soft tissue swelling.



IMPRESSION: Anatomic alignment status post total right hip replacement. No acute

process.











The above report was generated using voice recognition software.  It may contain

grammatical, syntax or spelling errors.







Electronically signed by:  Tan Rocha M.D.

8/11/2017 2:16 PM



Dictated Date/Time:  8/11/2017 2:15 PM

## 2018-01-22 ENCOUNTER — HOSPITAL ENCOUNTER (OUTPATIENT)
Dept: HOSPITAL 45 - C.RDSM | Age: 48
Discharge: HOME | End: 2018-01-22
Attending: ORTHOPAEDIC SURGERY
Payer: COMMERCIAL

## 2018-01-22 DIAGNOSIS — M25.532: ICD-10-CM

## 2018-01-22 DIAGNOSIS — M25.851: ICD-10-CM

## 2018-01-22 DIAGNOSIS — Z96.641: ICD-10-CM

## 2018-01-22 DIAGNOSIS — M76.11: Primary | ICD-10-CM

## 2018-01-29 ENCOUNTER — HOSPITAL ENCOUNTER (OUTPATIENT)
Dept: HOSPITAL 45 - C.MRI | Age: 48
Discharge: HOME | End: 2018-01-29
Attending: ORTHOPAEDIC SURGERY
Payer: COMMERCIAL

## 2018-01-29 DIAGNOSIS — M25.532: Primary | ICD-10-CM

## 2018-01-29 NOTE — DIAGNOSTIC IMAGING REPORT
MRI OF THE LEFT WRIST WITHOUT IV CONTRAST



CLINICAL HISTORY: Left wrist pain.



COMPARISON STUDY: Radiographs of the left wrist dated 1/22/2018.



TECHNIQUE: MRI of the left wrist is performed utilizing various T1 and

T2-weighted sequences in the axial, sagittal, and coronal planes. IV contrast

was not administered for this examination.



FINDINGS: There is no MRI evidence of fracture. Marrow edema is present within

the ulnar aspect of the lunate, and is typical in appearance for degenerative

change. Mild subchondral cyst formation is observed. The joint spaces appear

preserved. There is a tiny degenerative tear identified within the radial

attachment of the triangular fibrocartilage, best seen on coronal image #7.

There is a tiny tear involving the ulnar aspect of the triangular

fibrocartilage, best seen on coronal image #10. There is no fluid within the

distal radioulnar joint. A cutaneous marker has been placed along the posterior

aspect of the wrist at the level of the distal radius. The finding of palpable

concern corresponds to Gustavo's tubercle of the distal radius. No overlying soft

tissue edema is identified. The overlying tendons are normal in morphology. The

regional musculature is normal in bulk.



IMPRESSION:



1. There is marrow edema with subchondral cyst formation present within the

ulnar aspect of the lunate, typical in appearance for degenerative change. This

may be related to an ulnar impaction syndrome.



2. There are two tiny tears of the triangular fibrocartilage as above. No

high-grade tearing is seen.



3. The finding of palpable concern in the posterior wrist corresponds to a

prominent Gustavo's tubercle. There is no overlying soft tissue edema. The

overlying tendons are normal in appearance.







Dictated:  1/29/2018 3:09 PM

Transcribed:  1/29/2018 4:40 PM

[email or web address]







Electronically signed by:  Nicho Montoya M.D.

1/30/2018 2:13 PM



Dictated Date/Time:  1/29/2018 3:09 PM

## 2018-03-29 ENCOUNTER — HOSPITAL ENCOUNTER (EMERGENCY)
Dept: HOSPITAL 45 - C.EDB | Age: 48
Discharge: HOME | End: 2018-03-29
Payer: COMMERCIAL

## 2018-03-29 VITALS
BODY MASS INDEX: 39.38 KG/M2 | HEIGHT: 67.01 IN | WEIGHT: 250.89 LBS | BODY MASS INDEX: 39.38 KG/M2 | HEIGHT: 67.01 IN | WEIGHT: 250.89 LBS

## 2018-03-29 VITALS — DIASTOLIC BLOOD PRESSURE: 86 MMHG | SYSTOLIC BLOOD PRESSURE: 124 MMHG | HEART RATE: 86 BPM

## 2018-03-29 VITALS — TEMPERATURE: 97.88 F | OXYGEN SATURATION: 92 %

## 2018-03-29 DIAGNOSIS — S09.90XA: Primary | ICD-10-CM

## 2018-03-29 DIAGNOSIS — F41.9: ICD-10-CM

## 2018-03-29 DIAGNOSIS — Z87.442: ICD-10-CM

## 2018-03-29 DIAGNOSIS — E11.9: ICD-10-CM

## 2018-03-29 DIAGNOSIS — Z88.1: ICD-10-CM

## 2018-03-29 DIAGNOSIS — Z86.69: ICD-10-CM

## 2018-03-29 DIAGNOSIS — Z88.2: ICD-10-CM

## 2018-03-29 DIAGNOSIS — Z90.89: ICD-10-CM

## 2018-03-29 DIAGNOSIS — Z96.649: ICD-10-CM

## 2018-03-29 DIAGNOSIS — Z90.49: ICD-10-CM

## 2018-03-29 DIAGNOSIS — Z79.899: ICD-10-CM

## 2018-03-29 DIAGNOSIS — Z79.84: ICD-10-CM

## 2018-03-29 DIAGNOSIS — G89.29: ICD-10-CM
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
## 2018-03-29 DIAGNOSIS — Z91.048: ICD-10-CM

## 2018-03-29 DIAGNOSIS — Z88.6: ICD-10-CM

## 2018-03-29 DIAGNOSIS — F32.9: ICD-10-CM

## 2018-03-29 DIAGNOSIS — Z79.4: ICD-10-CM

## 2018-03-29 DIAGNOSIS — Z88.8: ICD-10-CM

## 2018-03-29 DIAGNOSIS — K21.9: ICD-10-CM

## 2018-03-29 DIAGNOSIS — I10: ICD-10-CM

## 2018-03-29 NOTE — DIAGNOSTIC IMAGING REPORT
CERVICAL SPINE W/O



CLINICAL HISTORY: 47 years-old Female presenting with head injury, neck pain. 



TECHNIQUE: Multidetector CT of the cervical spine was performed without the use

of intravenous contrast. IV contrast: None. A dose lowering technique was used

consistent with the principles of ALARA (as low as reasonably achievable).



COMPARISON: None.



CT DOSE (mGy.cm): The estimated cumulative dose is 1085.37.



FINDINGS:



 topogram: Unremarkable.



Straightening of normal cervical lordosis likely positional. No acute fracture

or subluxation. Vertebral bodies maintain normal height and alignment.

Intervertebral disc spaces preserved. Disc osteophyte complexes noted at C4-5

through C6-7. Prominent posterior bony spurring evident at C6-7, resulting in

mild osseous narrowing of the spinal canal at this level. No significant neural

foraminal narrowing. Bilateral ponticulus posticus. Skull base intact.

Paraspinal soft tissues within normal limits allowing for noncontrast technique.

Lung apices clear.



IMPRESSION:

1.  No acute osseous injury.



2.  Multilevel degenerative changes.







Electronically signed by:  Low Slaughter M.D.

3/29/2018 9:42 PM



Dictated Date/Time:  3/29/2018 9:38 PM

## 2018-03-29 NOTE — EMERGENCY ROOM VISIT NOTE
ED Visit Note


First contact with patient:  20:57


CHIEF COMPLAINT:  Head injury








HISTORY OF PRESENT ILLNESS: This 47-year-old female patient presented to the 

emergency department approximately 3 hours after receiving a head injury when a 

2 x 4 fell approximately 5 feet from the loft in the shed and landed on the top 

of her head.  The patient did not fall. There was no loss of consciousness.  

There has been no vomiting. The patient complains of nausea, left ear pain, and 

worsening head and neck pain which radiates toward her left shoulder.  The 

patient denies vomiting, loss of consciousness, dizziness, balance disturbances

, visual changes.  The headache has been worsening and she describes it as a 

pressure sensation behind her left eye.  The patient complains of severe neck 

pain "in my spine and in the muscles".  The patient has taken OxyContin, 

Fioricet, 800 mg ibuprofen for the pain without any relief.  She states despite 

these medications, the pain is severe.  She states "something has got to give".

  The patient rates the pain as 8/10 and throbbing.  The patient denies bowel 

or bladder dysfunction.  The patient denies any other injuries.  The board did 

not hit her shoulder, and she states she feels that the muscles are irritated 

from hitting her head.








REVIEW OF SYSTEMS:      A 10 system review of systems was performed with 

positives and pertinent negatives listed in the history of present illness. All 

other systems were reviewed and are negative. 








ALLERGIES: Sulfa, Phenergan, Compazine, Decadron, Niaspan, Reglan








MEDICATIONS: OxyContin, oxycodone, Fioricet, Zanaflex, Klonopin








PMH: Diabetes, hypertension, GERD, chronic pain, bacterial meningitis








SOCIAL HISTORY: The patient lives locally with family.  She denies drug, alcohol

, tobacco use.








PHYSICAL EXAM: Vital Signs: Reviewed Nurse's notes, vital signs stable.  GENERAL

: This is a 47-year-old obese white female, in no acute distress, well-developed

, well-nourished.  NEURO: The patient is alert, oriented to person place and 

time, and coherent.  Normal mini mental status exam.  Negative Romberg and 

pronator drift.  Cerebellar function intact.  HEAD: Normocephalic, atraumatic.  

EYES: Pupils are equal round and reactive to light and accommodation.  EOMs are 

full and optic discs and fundi are normal.  There is no swelling or 

discoloration of the tissue surrounding the eyes.  EARS: External auditory 

canals clear without blood.  NOSE: Patent without tenderness.  No septal 

hematoma.  FACE: No facial bone tenderness.   NECK: Supple.  There is cervical 

spine tenderness.  The patient does have tenderness with movement of the neck.  

Tenderness follows the trapezius muscle into the left shoulder.








RADIOLOGY:


HEAD WITHOUT CONTRAST (CT)





CLINICAL HISTORY: 47 years-old Female presenting with head injury. 





TECHNIQUE: Multidetector CT imaging of the head was performed without the use of


intravenous contrast. IV contrast: None. A dose lowering technique was used


consistent with the principles of ALARA (as low as reasonably achievable). 





COMPARISON: 7/26/2017.





CT DOSE (mGy.cm): The estimated cumulative dose is 1085.37 mGy.cm.





FINDINGS: 





 topogram: Unremarkable.





Ventricles and sulci normal in size. Brain parenchyma normal in appearance with


preserved gray-white differentiation. No mass effect or midline shift. No


hemorrhage or acute territorial infarct. No extra-axial fluid collection.


Paranasal sinuses and mastoid air cells clear. Calvarium intact.    





IMPRESSION:


1.  No acute intracranial abnormality. 











Electronically signed by:  Low Slaughter M.D.


3/29/2018 9:38 PM





Dictated Date/Time:  3/29/2018 9:36 PM





CERVICAL SPINE W/O





CLINICAL HISTORY: 47 years-old Female presenting with head injury, neck pain. 





TECHNIQUE: Multidetector CT of the cervical spine was performed without the use


of intravenous contrast. IV contrast: None. A dose lowering technique was used


consistent with the principles of ALARA (as low as reasonably achievable).





COMPARISON: None.





CT DOSE (mGy.cm): The estimated cumulative dose is 1085.37.





FINDINGS:





 topogram: Unremarkable.





Straightening of normal cervical lordosis likely positional. No acute fracture


or subluxation. Vertebral bodies maintain normal height and alignment.


Intervertebral disc spaces preserved. Disc osteophyte complexes noted at C4-5


through C6-7. Prominent posterior bony spurring evident at C6-7, resulting in


mild osseous narrowing of the spinal canal at this level. No significant neural


foraminal narrowing. Bilateral ponticulus posticus. Skull base intact.


Paraspinal soft tissues within normal limits allowing for noncontrast technique.


Lung apices clear.





IMPRESSION:


1.  No acute osseous injury.





2.  Multilevel degenerative changes.











Electronically signed by:  Low Slaughter M.D.


3/29/2018 9:42 PM





Dictated Date/Time:  3/29/2018 9:38 PM





ED COURSE:  I  examined the patient.  The patient immediately requests a CT 

scan prior to complete examination.  This was performed and reviewed by myself 

and radiologist as above.  The patient states her pain is very severe, and she 

has not taken a dose of her oxycodone which she is prescribed.  She will be 

given 10 mg oxycodone here in the emergency department.  The patient was 

reassessed and states she is feeling slightly improved, however continues to 

experience muscle spasms into her left shoulder.  She does request IV Ativan, 

as this has been given to her in the past and helps significantly with muscle 

spasms.  I offered IM Ativan in place of IV, as the patient declined p.o. 

medication.  The patient was agreeable to IM Ativan.  She was given 1 mg.  She 

was encouraged to use her chronic pain medications she has at home.  The 

patient was discharged home in good condition ambulatory.





I attest that I have personally reviewed the patient's current medication list. 


Blood Pressure Screening: Patient was found to have a slightly elevated blood 

pressure due to circumstances. I do not believe that the patient requires 

hypertension monitoring. 





Etiologies such as migraine, tumor, headache, sinus thrombosis, temporal 

arteritis, sinusitis, CVA, ICH, SAH, closed head injury, concussion, infection, 

as well as others were entertained.  








DIAGNOSIS: Closed head injury





The chart was completed utilizing Dragon Speech voice recognition software. 

Grammatical errors, random word insertions, pronoun errors, and incomplete 

sentences are an occasional consequence of this system due to software 

limitations, ambient noise, and hardware issues. Any formal questions or 

concerns about the content, text, or information contained within the body of 

this dictation should be directly addressed to the provider for clarification.


Problem List


Medical Problems:


(1) Anxiety


Status: Chronic  





(2) CALCULUS OF KIDNEY


Status: Resolved  





(3) Depression


Status: Chronic  





(4) Fibromyalgia


Status: Chronic  





(5) GERD (gastroesophageal reflux disease)


Status: Chronic  





(6) HLD (hyperlipidemia)


Status: Chronic  





(7) HTN (hypertension)


Status: Chronic  





(8) IDDM (insulin dependent diabetes mellitus)


Status: Chronic  





(9) maxillary sinus surgery


Status: Chronic  





(10) Meningitis


Status: Resolved  





(11) Migraine


Status: Chronic  





(12) MYALGIA AND MYOSITIS NOS


Status: Chronic  





(13) Sinus surgery


Status: Resolved  





Surgical Problems:


(1) H/O laparoscopy


Status: Chronic  





(2) History of cholecystectomy


Status: Resolved  





(3) History of tonsillectomy


Status: Chronic  





(4) S/P hip replacement


Status: Chronic  











Current/Historical Medications


Scheduled


Cholecalciferol (Vitamin D3), 1,000 INTER.UNIT PO QPM


Escitalopram Oxalate (Escitalopram Oxalate), 1 ML PO BID


Ferrous Sulfate (Ferrous Sulfate), 325 MG PO QPM


Fish Oil (Brookings-3), 2 CAP PO DAILY


Fluticasone Propionate (Fluticasone Propionate), 2 SPRAYS SMOOTH QPM


Insulin Aspart (Novolog Flexpen), 16 UNITS SC AC


Insulin Detemir (Levemir), 58 UNITS SQ HS


Lisinopril (Lisinopril), 10 MG PO DAILY


Metformin Hcl (Glucophage Ext Rel), 1,000 MG PO BID


Milk Thistle (Silybum Marianum (Milk Thistle), 2 CAP PO DAILY


Omeprazole (Prilosec), 20 MG PO BID


Oxycodone Hcl (Oxycontin), 40 MG PO Q12


Vitamin E (Vitamin E), 400 INTER.UNIT PO QPM





Scheduled PRN


Acetamin/Butalbital/Caffeine (Fioricet), 1-2 TABS PO Q4H PRN for Headache


Clonazepam (Klonopin), 1 MG PO BID PRN for Anxiety


Diphenoxylate/Atropine (Lomotil), 0.5 TAB PO Q6H PRN for Diarrhea


Hyoscyamine Sulfate (Levsin), 0.125-0.25 MG PO Q6H PRN for Abdominal Cramping


Oxycodone Hcl (Oxycodone Hcl), 10 MG PO Q6H PRN for Breakthrough Pain


Tizanidine (Zanaflex ), 4 MG PO TID PRN for Fibromyalgia





Allergies


Coded Allergies:  


     Metoclopramide (Verified  Allergy, Intermediate, RASH,ITCH, 3/29/18)


     Sulfa Antibiotics (Verified  Allergy, Intermediate, Rash / Itching, 3/29/18

)


     Dexamethasone (Verified  Allergy, Mild, ITCH, 3/29/18)


 PT REPORTS DIFFUSE BODY ITCH/REDNESS. NOT HIVES PER DR. NEVES.


     Adhesives (Unverified  Allergy, Unknown, REDNESS WITH SOME TAPE-INNER 

THIGH AREA SENSITIVE AREA, 3/29/18)


     Levofloxacin (Verified  Allergy, Unknown, PARANOIA, 3/29/18)


     Morphine (Verified  Allergy, Unknown, NAUSEA AND VOMITING, 3/29/18)


 PT THINKS RECEIVED TOO FAST


     Niacin (Verified  Allergy, Unknown, HIVES, 3/29/18)


     Prochlorperazine (Verified  Adverse Reaction, Intermediate, Dystonic, 3/29/

18)


     Promethazine (Verified  Adverse Reaction, Intermediate, Dystonic, 3/29/18)





Vital Signs











  Date Time  Temp Pulse Resp B/P (MAP) Pulse Ox O2 Delivery O2 Flow Rate FiO2


 


3/29/18 21:18  86 18 124/86    


 


3/29/18 20:32   17     


 


3/29/18 20:31 36.6 95 18 147/93 92 Room Air  











Medications Administered











 Medications


  (Trade)  Dose


 Ordered  Sig/Carmelita


 Route  Start Time


 Stop Time Status Last Admin


Dose Admin


 


 Ondansetron HCl


  (Zofran Odt)  4 mg  ONE  STAT


 PO  3/29/18 21:08


 3/29/18 21:09 DC 3/29/18 21:08


4 MG


 


 Oxycodone HCl


  (Roxicodone


 Immediate Rel Tab)  10 mg  NOW  STAT


 PO  3/29/18 21:08


 3/29/18 21:09 DC 3/29/18 21:08


10 MG


 


 Lorazepam


  (Ativan Inj)  1 mg  NOW  STAT


 IM  3/29/18 21:55


 3/29/18 21:58 DC 3/29/18 21:55


1 MG











Departure Information


Impression





 Primary Impression:  


 Closed head injury





Dispostion


Home / Self-Care





Condition


GOOD





Referrals


Cristy Braden M.D. (PCP)





Patient Instructions


ED [facility] Cl[facility], [facility]





Additional Instructions





You have been treated in the Emergency Department for a Closed Head Injury.  

You have received pain medicine in the emergency department which impairs your 

ability to operate a vehicle. It is illegal for you to drive after receiving 

these medicines. 





CT Scan of your head/brain demonstrated no acute bleeding or other 

abnormalities. This does not completely rule out the risk for future damage to 

the brain.





Take your regularly prescribed narcotics and benzodiazepines/muscle relaxers to 

help with pain and spasms.





For pain control, you can use the following over-the-counter medicines (if >13 yo):


Ibuprofen(Motrin, Advil) may be used for fever or pain.  Use 600mg every six 

hours as needed.  Take with food.  Avoid using more than 2400mg in a 24 hour 

period.  Do not use 2400mg per day for more than three consecutive days without 

physician direction.  Prolonged inappropriate use can lead to stomach upset or 

ulcers. 


(AND/OR)


Acetaminophen(Tylenol) may be used for fever or pain.  Use 1000mg every six 

hours as needed.  Avoid using more than 3000mg in a 24 hour period.  





You should relax in a quiet, dark place for the rest of the day. Avoid any 

possible triggers including: cigarette smoke, caffeine, nicotine, chocolate, 

wine, beer, loud noises or music, or bright lights. 





You should schedule a follow-up appointment in 2-3 days with your Primary Care 

Provider or established Neurologist for further evaluation and treatment of 

your Headache.





Return to the Emergency Department if your current symptoms worsen despite 

treatment course outlined above, or if you develop any of the following symptoms

: intractable pain despite aforementioned treatment course, visual disturbances

, loss of vision, unilateral weakness or facial drooping, slurring of speech, 

loss of coordination, or loss of consciousness.





Problem Qualifiers








 Primary Impression:  


 Closed head injury


 Encounter type:  initial encounter  Qualified Codes:  S09.90XA - Unspecified 

injury of head, initial encounter

## 2018-03-29 NOTE — DIAGNOSTIC IMAGING REPORT
HEAD WITHOUT CONTRAST (CT)



CLINICAL HISTORY: 47 years-old Female presenting with head injury. 



TECHNIQUE: Multidetector CT imaging of the head was performed without the use of

intravenous contrast. IV contrast: None. A dose lowering technique was used

consistent with the principles of ALARA (as low as reasonably achievable). 



COMPARISON: 7/26/2017.



CT DOSE (mGy.cm): The estimated cumulative dose is 1085.37 mGy.cm.



FINDINGS: 



 topogram: Unremarkable.



Ventricles and sulci normal in size. Brain parenchyma normal in appearance with

preserved gray-white differentiation. No mass effect or midline shift. No

hemorrhage or acute territorial infarct. No extra-axial fluid collection.

Paranasal sinuses and mastoid air cells clear. Calvarium intact.    



IMPRESSION:

1.  No acute intracranial abnormality. 







Electronically signed by:  Low Slaughter M.D.

3/29/2018 9:38 PM



Dictated Date/Time:  3/29/2018 9:36 PM